# Patient Record
Sex: FEMALE | Race: WHITE | NOT HISPANIC OR LATINO
[De-identification: names, ages, dates, MRNs, and addresses within clinical notes are randomized per-mention and may not be internally consistent; named-entity substitution may affect disease eponyms.]

---

## 2019-05-06 ENCOUNTER — APPOINTMENT (OUTPATIENT)
Dept: ORTHOPEDIC SURGERY | Facility: CLINIC | Age: 54
End: 2019-05-06
Payer: COMMERCIAL

## 2019-05-06 DIAGNOSIS — Z87.39 PERSONAL HISTORY OF OTHER DISEASES OF THE MUSCULOSKELETAL SYSTEM AND CONNECTIVE TISSUE: ICD-10-CM

## 2019-05-06 DIAGNOSIS — Z82.62 FAMILY HISTORY OF OSTEOPOROSIS: ICD-10-CM

## 2019-05-06 DIAGNOSIS — Z80.9 FAMILY HISTORY OF MALIGNANT NEOPLASM, UNSPECIFIED: ICD-10-CM

## 2019-05-06 DIAGNOSIS — M25.561 PAIN IN RIGHT KNEE: ICD-10-CM

## 2019-05-06 DIAGNOSIS — Z82.61 FAMILY HISTORY OF ARTHRITIS: ICD-10-CM

## 2019-05-06 PROCEDURE — 99213 OFFICE O/P EST LOW 20 MIN: CPT

## 2019-05-06 PROCEDURE — 73560 X-RAY EXAM OF KNEE 1 OR 2: CPT | Mod: LT

## 2019-05-06 PROCEDURE — 73564 X-RAY EXAM KNEE 4 OR MORE: CPT | Mod: RT

## 2019-05-06 RX ORDER — NAPROXEN 500 MG/1
500 TABLET ORAL
Qty: 60 | Refills: 0 | Status: ACTIVE | COMMUNITY
Start: 2019-05-06 | End: 1900-01-01

## 2019-05-06 NOTE — HISTORY OF PRESENT ILLNESS
[de-identified] : 53 year old female presents for initial evaluation of right knee pain for the past 3 months. Patient denies any specific injury. She states she had difficulty extending her knee for the past few months, and developed significant swelling on a 7 hr plane ride last week. She states this episode improved with ice, elevation, and Advil, but she continues to have medial sided pain. It is a dull pain associated with swelling, clicking, and loss of motion. Her walking is not limited, and she negotiates stairs normally. She states she was able to do stationary bike and elliptical at the gym, but with pain. She has history of a right knee surgical arthroscopy in 2009. Today, she would like to discuss her treatment options with Dr. Arguelles.

## 2019-05-06 NOTE — ADDENDUM
[FreeTextEntry1] : This note was written by Blanche Little on 05/06/2019 acting as scribe for Dr. Jose Arguelles M.D.\par \par I, Dr. Jose Arguelles M.D., have read and attest that all the information, medical decision making and discharge instructions within are true and accurate.\par

## 2019-05-06 NOTE — DISCUSSION/SUMMARY
[de-identified] : Discussed at length the nature of the patients condition. Their right knee symptoms appear to be patellofemoral, probable chondromalacia. She may have a medial meniscal tear. I therefore suggested we obtain a MRI of the right knee to evaluate interarticular pathology. If MRI shows a meniscal tear, consideration will be given to a surgical arthroscopy. In the interim, I recommend PT following [facility] protocol and placed her on Naprosyn 500 BID. They can continue activities as tolerated. When results are available, we will discuss further.

## 2019-05-06 NOTE — PHYSICAL EXAM
[de-identified] : General appearance: well nourished and hydrated, pleasant, alert and oriented x 3, cooperative.\par HEENT: Normocephalic, EOM intact, Nasal septum midline, Oral cavity clear, External auditory canal clear.\par Cardiovascular: no apparent abnormalities, no lower leg edema, bilateral varicosities, pedal pulses are palpable.\par Lymphatics Lymph nodes: none palpated, Lymphedema: not present.\par Neurologic: sensation is normal, no muscle weakness in upper or lower extremities, patella tendon reflexes intact .\par Dermatologic no apparent skin lesions, moist, warm, no rash.\par Spine: cervical spine appears normal and moves freely, thoracic spine appears normal and moves freely, lumbosacral spine appears normal and moves freely.\par Gait: nonantalgic.\par \par Left knee\par Inspection: no effusion or erythema.\par Wounds: none.\par Alignment: normal.\par Palpation: no specific tenderness on palpation.\par ROM active (in degrees): 0-135\par Ligamentous laxity: all ligaments appear stable,, negative ant. drawer test, negative post. drawer test, stable to varus stress test, stable to valgus stress test. negative Lachman's test, negative pivot shift test\par Meniscal Test: negative McMurrays, negative Aquiles.\par Patellofemoral Alignment Test: Q angle-, normal.\par Muscle Test: good quad strength.\par Leg examination: calf is soft and non-tender.\par tight hamstring, popliteal angle 30 degrees, negative Wyatt test, tight IT band \par \par Right knee\par Inspection: trace effusion \par Wounds: healed arthroscopic portals \par Alignment: normal.\par Palpation: medial tenderness on palpation.\par ROM active (in degrees): 0-135 with discomfort on extremes of flexion and extension \par Ligamentous laxity: all ligaments appear stable,, negative ant. drawer test, negative post. drawer test, stable to varus stress test, stable to valgus stress test. negative Lachman's test, negative pivot shift test\par Meniscal Test: mildly positive McMurrays, negative Aquiles.\par Patellofemoral Alignment Test: Q angle-, normal.\par Muscle Test: good quad strength.\par Leg examination: calf is soft and non-tender.\par tight hamstring, popliteal angle 30 degrees, negative Wyatt test, tight IT band \par \par Left hip\par Inspection: No swelling or ecchymosis.\par Wounds: none.\par Palpation: tender over greater trochanter.\par Stability: no instability.\par Strength: 5/5 all motor groups.\par ROM: no pain with FROM.\par Leg length: equal.\par \par Right hip\par Inspection: No swelling or ecchymosis.\par Wounds: none.\par Palpation:  tender over greater trochanter.\par Stability: no instability.\par Strength: 5/5 all motor groups.\par ROM: no pain with FROM.\par Leg length: equal.\par \par Left ankle\par Inspection: no erythema noted, no swelling noted.\par Palpation: no pain on palpation .\par ROM: FROM without crepitus.\par Muscle strength: 5/5.\par Stability: no instability noted.\par \par Right ankle\par Inspection: no erythema noted, no swelling noted.\par ROM: FROM without crepitus.\par Palpation: no pain on palpation .\par Muscle strength: 5/5.\par Stability: no instability noted.\par \par Left foot\par Inspection: color, texture and turgor are normal.\par ROM: full range of motion of all joints without pain or crepitus.\par Palpation: no tenderness.\par Stability: no instability noted.\par \par Right foot\par Inspection: color, texture and turgor are normal.\par ROM: full range of motion of all joints without pain or crepitus.\par Palpation: no tenderness.\par Stability: no instability noted.\par \par Left shoulder\par Inspection: no muscle asymmetry, no atrophy.\par Palpation: no tenderness noted, ACJ non-tender.\par ROM: full active ROM, full passive ROM.\par Strength testing): anterior deltoid, supraspinatus, infraspinatus, subscapularis all 5/5.\par Stability test: ant. apprehension negative, post. apprehension negative, relocation test negative.\par Impingement Test: negative NEER.\par \par Right shoulder\par Inspection: no muscle asymmetry, no atrophy.\par Palpation: no tenderness noted, ACJ non-tender.\par ROM: full active ROM, full passive ROM.\par Strength testing): anterior deltoid, supraspinatus, infraspinatus, subscapularis all 5/5.\par Stability test: ant. apprehension negative, post. apprehension negative, relocation test negative.\par Impingement Test: negative NEER.\par Surgical Wounds: none.\par \par Left elbow\par Inspection: negative swelling.\par Wounds: none.\par Palpation: non-tender.\par ROM: full ROM.\par Strength: 5/5 all groups.\par Stability: no instability.\par Mass: none.\par \par Right elbow\par Inspection: negative swelling.\par Wounds: none.\par Palpation: non-tender.\par ROM: full ROM.\par Strength: 5/5 all groups.\par Stability: no instability.\par Mass: none.\par \par Left wrist\par Inspection: negative swelling.\par Wound: none.\par Palpation (bone): no tenderness.\par ROM: full ROM.\par Strength: full , good.\par \par Right wrist\par Inspection: negative swelling.\par Wound: none.\par Palpation (bone): no tenderness.\par ROM: full ROM.\par Strength: full , good.\par \par Left hand\par Inspection: no skin changes, normal appearance.\par Wounds: none.\par Strength: full , able to make full fist.\par Sensation: light touch intact all fingers and thumb.\par Vascular: good capillary refill < 3 seconds, all fingers and thumb.\par Mass: none.\par \par Right hand\par Inspection: no skin changes, normal appearance. \par Wounds: none.\par Palpation: non-tender throughout.\par Strength: full , able to make full fist.\par Sensation: light touch intact all fingers and thumb.\par Vascular: good capillary refill < 3 seconds, all fingers and thumb.\par Mass: none.\par   [de-identified] : Left knee xray merchant view, taken at the office today demonstrates good joint space and a slight laterally tracking patella \par \par Right knee xrays, standing AP/Lateral, Merchant, and 45 degree PA standing view, taken at the office today shows normal alignment, good joint space maintained, slight laterally tracking patella on merchant view

## 2019-05-31 ENCOUNTER — RESULT REVIEW (OUTPATIENT)
Age: 54
End: 2019-05-31

## 2021-05-24 ENCOUNTER — APPOINTMENT (OUTPATIENT)
Dept: ORTHOPEDIC SURGERY | Facility: CLINIC | Age: 56
End: 2021-05-24
Payer: COMMERCIAL

## 2021-05-24 VITALS — WEIGHT: 190 LBS | BODY MASS INDEX: 29.82 KG/M2 | HEIGHT: 67 IN

## 2021-05-24 DIAGNOSIS — S83.241A OTHER TEAR OF MEDIAL MENISCUS, CURRENT INJURY, RIGHT KNEE, INITIAL ENCOUNTER: ICD-10-CM

## 2021-05-24 DIAGNOSIS — M22.41 CHONDROMALACIA PATELLAE, RIGHT KNEE: ICD-10-CM

## 2021-05-24 PROCEDURE — 73564 X-RAY EXAM KNEE 4 OR MORE: CPT | Mod: RT

## 2021-05-24 PROCEDURE — 73560 X-RAY EXAM OF KNEE 1 OR 2: CPT | Mod: LT

## 2021-05-24 PROCEDURE — 99072 ADDL SUPL MATRL&STAF TM PHE: CPT

## 2021-05-24 PROCEDURE — 99214 OFFICE O/P EST MOD 30 MIN: CPT

## 2021-05-24 NOTE — DISCUSSION/SUMMARY
[de-identified] : Discussed at length the nature of the patients condition. Their right knee symptoms appear to be related to torn medial meniscus, possibly a component of chondromalacia of the patella. I suggest we obtain an MRI due to the severity of her pain and persistence of her symptoms. If she does have a meniscal tear we will consider a surgical arthroscopy which was discussed with the patient. In the interim I recommend PT and Tylenol. When results are available, we will discuss further.

## 2021-05-24 NOTE — ADDENDUM
[FreeTextEntry1] : This note was written by Chilo Salazar on 05/24/2021 acting scribe for Dr. Jose Arguelles M.D.\par \par I Dr. Jose Arguelles have read and attest that all the information, medical decision making, and discharge instructions within are true and accurate.

## 2021-05-24 NOTE — HISTORY OF PRESENT ILLNESS
[de-identified] : 55 year old female presents for follow up evaluation of an acute exacerbation of her chronic right knee pain. She was doing good up until 6 weeks ago, stating that she was loading her trunk with groceries while also supporting a heavy box with her right knee, endorsing pain right after. She endorses pain at the medial compartment of the joint that is worse with activity. She does not take any OTC medication. She denies buckling, locking or clicking but does endorse mild swelling. The patient recently stopped her home exercises to see if her symptoms resolved--her symptoms only mildly resolved. The patient would like to discuss a further treatment plan with Dr. Arguelles today

## 2021-05-24 NOTE — PHYSICAL EXAM
[de-identified] : Right knee\par Inspection: trace effusion \par Wounds: healed arthroscopic portals \par Alignment: normal.\par Palpation: medial tenderness on palpation.\par ROM active (in degrees): 5-135 with pain on extremes of flexion and extension \par Ligamentous laxity: all ligaments appear stable,, negative ant. drawer test, negative post. drawer test, stable to varus stress test, stable to valgus stress test. negative Lachman's test, negative pivot shift test\par Meniscal Test: positive McMurrays, positive Aquiles.\par Patellofemoral Alignment Test: Q angle-, normal.\par Muscle Test: good quad strength.\par Leg examination: calf is soft and non-tender.\par tight hamstring, popliteal angle 30 degrees, negative Wyatt test, tight IT band \par \par Left knee\par Inspection: no effusion or erythema.\par Wounds: none.\par Alignment: normal.\par Palpation: no specific tenderness on palpation.\par ROM active (in degrees): 0-135\par Ligamentous laxity: all ligaments appear stable,, negative ant. drawer test, negative post. drawer test, stable to varus stress test, stable to valgus stress test. negative Lachman's test, negative pivot shift test\par Meniscal Test: negative McMurrays, negative Aquiles.\par Patellofemoral Alignment Test: Q angle-, normal.\par Muscle Test: good quad strength.\par Leg examination: calf is soft and non-tender.\par tight hamstring, popliteal angle 30 degrees, negative Wyatt test, tight IT band  [de-identified] : RIGHT knee xrays, 4 views standing AP/Lateral, Merchant, and 45 degree PA standing view, taken at the office today shows  normal alignment, good joint space maintained, well centered patella. \par \par LEFT knee xray merchant view, taken at the office today demonstrates good joint space and a slight laterally tracking patella \par \par MRI RIght knee taken 5/24/2:19 films brought in, report on the chart and was reviewed.\par  - - -

## 2021-06-07 ENCOUNTER — TRANSCRIPTION ENCOUNTER (OUTPATIENT)
Age: 56
End: 2021-06-07

## 2022-06-07 ENCOUNTER — APPOINTMENT (OUTPATIENT)
Dept: ORTHOPEDIC SURGERY | Facility: CLINIC | Age: 57
End: 2022-06-07
Payer: COMMERCIAL

## 2022-06-07 VITALS — BODY MASS INDEX: 29.82 KG/M2 | HEIGHT: 67 IN | RESPIRATION RATE: 16 BRPM | WEIGHT: 190 LBS

## 2022-06-07 DIAGNOSIS — M18.11 UNILATERAL PRIMARY OSTEOARTHRITIS OF FIRST CARPOMETACARPAL JOINT, RIGHT HAND: ICD-10-CM

## 2022-06-07 PROCEDURE — 99203 OFFICE O/P NEW LOW 30 MIN: CPT | Mod: 25

## 2022-06-07 PROCEDURE — 20600 DRAIN/INJ JOINT/BURSA W/O US: CPT

## 2022-06-07 PROCEDURE — 73110 X-RAY EXAM OF WRIST: CPT | Mod: 50

## 2024-01-04 ENCOUNTER — APPOINTMENT (OUTPATIENT)
Dept: ORTHOPEDIC SURGERY | Facility: CLINIC | Age: 59
End: 2024-01-04
Payer: COMMERCIAL

## 2024-01-04 VITALS
WEIGHT: 157 LBS | HEIGHT: 67 IN | DIASTOLIC BLOOD PRESSURE: 78 MMHG | SYSTOLIC BLOOD PRESSURE: 117 MMHG | BODY MASS INDEX: 24.64 KG/M2 | HEART RATE: 65 BPM

## 2024-01-04 DIAGNOSIS — M18.11 UNILATERAL PRIMARY OSTEOARTHRITIS OF FIRST CARPOMETACARPAL JOINT, RIGHT HAND: ICD-10-CM

## 2024-01-04 DIAGNOSIS — M18.12 UNILATERAL PRIMARY OSTEOARTHRITIS OF FIRST CARPOMETACARPAL JOINT, LEFT HAND: ICD-10-CM

## 2024-01-04 PROCEDURE — 20600 DRAIN/INJ JOINT/BURSA W/O US: CPT | Mod: F5

## 2024-01-04 PROCEDURE — 99214 OFFICE O/P EST MOD 30 MIN: CPT | Mod: 25

## 2024-01-04 PROCEDURE — 73110 X-RAY EXAM OF WRIST: CPT | Mod: RT

## 2024-01-05 NOTE — PROCEDURE
[] : right  [FreeTextEntry1] : The injection was done following verbal timeout site verification, in 2 phases with the first phase being subcutaneous injection of lidocaine 1.5 cc subcutaneously as anesthetic. When adequate level of anesthesia was achieved, the Kenalog injection was performed without complication.  **********KENALOG 40 mg injected, NO Celestone injected.***********

## 2024-01-05 NOTE — HISTORY OF PRESENT ILLNESS
[FreeTextEntry1] : Patient is a 59 yo LHD female with hand past hand history of having seen Laz Dozier MD 6/7/2022.   Patient was noted to have bilateral basal joint arthritis. Patient treated with right basal joint Kenalog 10 mg injection. Neoprene thumb spica splint prescribed. In addition, the patient had left thumb basal joint cortisone injection approximately 2016 by Dr. Dozier. 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  TODAY:      The patient presents as a NEW PATIENT to me. The patient reports pain in both thumb basal joints right more than left. Patient states that the cortisone injection into right basal joint administered by Dr. Dozier 6/7/2022 did not provide relief. Patient reports that she has background pain in the right basal joint that is fairly continuous. Patient states that when lifting weights or using cell phone she can have pain that is perceived as "excruciating." Patient states that she had the cortisone injection of the left thumb approximately 2016 and has had mild discomfort in the left thumb that has been tolerable and has not needed any other treatment. Patient states she presents today primarily because of the pain in the right thumb seeking analysis and treatment. The patient has taken NSAIDs in the past but not recently. Patient would prefer to avoid oral NSAIDs. Patient denies numbness, tingling, triggering. Patient has no other active hand complaints.

## 2024-01-05 NOTE — ASSESSMENT
[FreeTextEntry1] : The patient was previously treated by Laz Dozier MD 2022 with cortisone injection into right basal joint.  Patient states that the injection was not helpful.  Symptoms have continued but have become increasingly more problematic.  Patient presented for evaluation and treatment and tentatively cortisone injection for right basal joint. Patient had been treated with cortisone injection for left thumb basal joint 2016.  Patient reports that the left basal joint is mildly symptomatic and she feels that she needs no cortisone injection for the left basal joint arthritis at this time. 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  Today's radiographs confirm right basal joint arthritis stage II-mild stage III.  Left basal joint arthritis stage II with large loose bodies and subcutaneous tissue alongside trapezium.  After reviewing treatment options risks and complications and at patient request, patient was treated with Kenalog 40 mg injection into right thumb basal joint following lidocaine field block.  Injection was well-tolerated.  Prognosis is uncertain.  I have explained to patient that duration of relief following cortisone injection and can range from no improvement to 6 months and rarely lasts longer.  I informed patient that I am no longer performing basal joint reconstructive surgery and would refer her to one of my [facility] hand surgery colleagues if she wants to proceed with surgery.  HMTS discussed but declined by patient.  Oral medication and topical diclofenac gel application up to 3 times per day reviewed as alternative treatment options.  Patient has taken naproxen in the past but not currently.  Patient not prepared to undertake any of these treatments at this time and will contact office if she wishes to have a prescription.  Return as needed. A lengthy and detailed discussion was held with the patient regarding analysis, treatment, and recommendations. All questions have been answered. At the conclusion the patient expressed acceptance, understanding and agreement with the plan.

## 2024-01-05 NOTE — PHYSICAL EXAM
[de-identified] : Right wrist E/F 50/50 degrees without pain No obvious localizing wrist joint findings. Right basal joint Slight swelling. Joint line tenderness 1+-3+.  Variation because of different areas of joint that are tender Manipulation of basal joint: No obvious crepitus Inconsistent pain with manipulation Pain of right basal joint palpation and/or manipulation recreates patient's complaint STT joint palpation and manipulation nonpainful  Right hand No A1 pulley tenderness and no triggering in any finger. No pertinent MP, PIP, or DIP joint contributory findings, except some Heberden's nodes; none are clinically painful.  Left wrist E/F 50 degrees / 50 degrees without obvious positive findings. Left basal joint Manipulation 1+ crepitus minimal discomfort Minimal tenderness  Left hand No A1 pulley tenderness and no triggering in any finger. No pertinent MP, PIP, or DIP joint contributory findings, except Heberden's nodes all fingers; none are clinically painful.  Neurologic: Median, ulnar, and radial motor and sensory are intact.  Skin: No cyanosis, clubbing, or rashes. Vascular: Radial pulses intact. Lymphatic: No streaking or epitrochlear adenopathy. The patient is awake, alert, and oriented. Affect appropriate. Cooperative.  [de-identified] : Radiographs ordered and interpreted by me today, reviewed and discussed with the patient today.  3 views right wrist, basal joint, hand. Basal joint narrowing with loss of joint space visible on basal joint lateral radiograph. Osteophyte formation at distal radial corner trapezium. Changes consistent with stage II-mild stage III basal joint arthritis. Radiocarpal and midcarpal joint spaces maintained without degenerative change. MP joint spaces and PIP joint spaces maintained. Thumb IP joint loose body visible on PA radiograph. Subtle/mild degenerative change DIP 2, DIP 3 DIP 5. No fractures or dislocations.  3 views left wrist, basal joint, hand. Calcific loose bodies radial aspect of trapezium subcutaneously/adjoining trapezium. Osteophyte formation of trapezium both distal radial and distal ulnar aspects. Basal joint lateral: Loss of joint space, flattening first metacarpal base, subchondral cyst formation, small volar beak first metacarpal exostosis. Radiocarpal and midcarpal joint spaces well-maintained. MP joints and PIP joints without any notable degenerative change. Mild marginal degenerative change DIP 2, 3, 4, 5. No fractures or dislocations.

## 2024-01-05 NOTE — CONSULT LETTER
[Dear  ___] : Dear  [unfilled], [Consult Letter:] : I had the pleasure of evaluating your patient, [unfilled]. [FreeTextEntry1] : The patient was seen in hand consultation today. A copy of my office note is enclosed for your review with the patient's knowledge and consent.  Sincerely,  Nikolas Grijalva MD Chief, Hand Surgery Residency  ([phone]) Department of Orthopaedic Surgery  [facility] Professor of Orthopaedic Surgery Miriam MIRELES at [facility]

## 2025-09-11 ENCOUNTER — APPOINTMENT (OUTPATIENT)
Dept: ORTHOPEDIC SURGERY | Facility: CLINIC | Age: 60
End: 2025-09-11